# Patient Record
Sex: MALE | Race: BLACK OR AFRICAN AMERICAN | NOT HISPANIC OR LATINO | Employment: FULL TIME | URBAN - METROPOLITAN AREA
[De-identification: names, ages, dates, MRNs, and addresses within clinical notes are randomized per-mention and may not be internally consistent; named-entity substitution may affect disease eponyms.]

---
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
## 2021-06-30 ENCOUNTER — HOSPITAL ENCOUNTER (EMERGENCY)
Facility: HOSPITAL | Age: 21
Discharge: HOME | End: 2021-06-30
Attending: EMERGENCY MEDICINE
Payer: MEDICAID

## 2021-06-30 VITALS
DIASTOLIC BLOOD PRESSURE: 60 MMHG | OXYGEN SATURATION: 97 % | SYSTOLIC BLOOD PRESSURE: 127 MMHG | WEIGHT: 130 LBS | RESPIRATION RATE: 16 BRPM | BODY MASS INDEX: 19.7 KG/M2 | TEMPERATURE: 99 F | HEART RATE: 86 BPM | HEIGHT: 68 IN

## 2021-06-30 DIAGNOSIS — J06.9 VIRAL URI: Primary | ICD-10-CM

## 2021-06-30 LAB
INFLUENZA A, MOLECULAR: NEGATIVE
INFLUENZA B, MOLECULAR: NEGATIVE
SARS-COV-2 RDRP RESP QL NAA+PROBE: NEGATIVE
SPECIMEN SOURCE: NORMAL

## 2021-06-30 PROCEDURE — 99283 EMERGENCY DEPT VISIT LOW MDM: CPT | Mod: ER

## 2021-06-30 PROCEDURE — 87502 INFLUENZA DNA AMP PROBE: CPT | Mod: ER | Performed by: PHYSICIAN ASSISTANT

## 2021-06-30 PROCEDURE — U0002 COVID-19 LAB TEST NON-CDC: HCPCS | Mod: ER | Performed by: PHYSICIAN ASSISTANT

## 2021-06-30 RX ORDER — CETIRIZINE HYDROCHLORIDE 10 MG/1
10 TABLET ORAL DAILY
Qty: 30 TABLET | Refills: 0 | Status: SHIPPED | OUTPATIENT
Start: 2021-06-30 | End: 2021-07-30

## 2022-04-01 ENCOUNTER — OCCUPATIONAL HEALTH (OUTPATIENT)
Dept: URGENT CARE | Facility: CLINIC | Age: 22
End: 2022-04-01

## 2022-04-01 DIAGNOSIS — Z00.00 ENCOUNTER FOR PHYSICAL EXAMINATION: Primary | ICD-10-CM

## 2022-04-01 PROCEDURE — 99499 UNLISTED E&M SERVICE: CPT | Mod: S$GLB,,, | Performed by: NURSE PRACTITIONER

## 2022-04-01 PROCEDURE — 97750 LIFT TEST: ICD-10-PCS | Mod: S$GLB,,, | Performed by: NURSE PRACTITIONER

## 2022-04-01 PROCEDURE — 99499 PHYSICAL, BASIC COMPLEXITY: ICD-10-PCS | Mod: S$GLB,,, | Performed by: NURSE PRACTITIONER

## 2022-04-01 PROCEDURE — 80305 OOH COLLECTION ONLY DRUG SCREEN: ICD-10-PCS | Mod: S$GLB,,, | Performed by: NURSE PRACTITIONER

## 2022-04-01 PROCEDURE — 97750 PHYSICAL PERFORMANCE TEST: CPT | Mod: S$GLB,,, | Performed by: NURSE PRACTITIONER

## 2022-04-01 PROCEDURE — 80305 DRUG TEST PRSMV DIR OPT OBS: CPT | Mod: S$GLB,,, | Performed by: NURSE PRACTITIONER

## 2022-04-19 ENCOUNTER — HOSPITAL ENCOUNTER (EMERGENCY)
Facility: HOSPITAL | Age: 22
Discharge: HOME | End: 2022-04-19
Attending: EMERGENCY MEDICINE
Payer: MEDICAID

## 2022-04-19 VITALS
RESPIRATION RATE: 16 BRPM | OXYGEN SATURATION: 98 % | HEART RATE: 86 BPM | WEIGHT: 140 LBS | DIASTOLIC BLOOD PRESSURE: 74 MMHG | HEIGHT: 69 IN | TEMPERATURE: 99 F | SYSTOLIC BLOOD PRESSURE: 118 MMHG | BODY MASS INDEX: 20.73 KG/M2

## 2022-04-19 DIAGNOSIS — J02.0 PHARYNGITIS, STREPTOCOCCAL, ACUTE: Primary | ICD-10-CM

## 2022-04-19 LAB
GROUP A STREP, MOLECULAR: POSITIVE
INFLUENZA A, MOLECULAR: NEGATIVE
INFLUENZA B, MOLECULAR: NEGATIVE
SARS-COV-2 RDRP RESP QL NAA+PROBE: NEGATIVE
SPECIMEN SOURCE: NORMAL

## 2022-04-19 PROCEDURE — 63600175 PHARM REV CODE 636 W HCPCS: Mod: JG,ER | Performed by: EMERGENCY MEDICINE

## 2022-04-19 PROCEDURE — 99284 EMERGENCY DEPT VISIT MOD MDM: CPT | Mod: 25,ER

## 2022-04-19 PROCEDURE — U0002 COVID-19 LAB TEST NON-CDC: HCPCS | Mod: ER | Performed by: EMERGENCY MEDICINE

## 2022-04-19 PROCEDURE — 87502 INFLUENZA DNA AMP PROBE: CPT | Mod: ER | Performed by: EMERGENCY MEDICINE

## 2022-04-19 PROCEDURE — 87502 INFLUENZA DNA AMP PROBE: CPT | Mod: ER

## 2022-04-19 PROCEDURE — 96372 THER/PROPH/DIAG INJ SC/IM: CPT | Performed by: EMERGENCY MEDICINE

## 2022-04-19 PROCEDURE — 87651 STREP A DNA AMP PROBE: CPT | Mod: ER | Performed by: EMERGENCY MEDICINE

## 2022-04-19 RX ORDER — DEXAMETHASONE SODIUM PHOSPHATE 4 MG/ML
8 INJECTION, SOLUTION INTRA-ARTICULAR; INTRALESIONAL; INTRAMUSCULAR; INTRAVENOUS; SOFT TISSUE
Status: COMPLETED | OUTPATIENT
Start: 2022-04-19 | End: 2022-04-19

## 2022-04-19 RX ADMIN — DEXAMETHASONE SODIUM PHOSPHATE 8 MG: 4 INJECTION, SOLUTION INTRA-ARTICULAR; INTRALESIONAL; INTRAMUSCULAR; INTRAVENOUS; SOFT TISSUE at 08:04

## 2022-04-19 RX ADMIN — PENICILLIN G BENZATHINE 1.2 MILLION UNITS: 1200000 INJECTION, SUSPENSION INTRAMUSCULAR at 08:04

## 2022-04-19 NOTE — Clinical Note
"Nusrat Hungvero Hart was seen and treated in our emergency department on 4/19/2022.  He may return to work on 04/21/2022.       If you have any questions or concerns, please don't hesitate to call.      PRIYA chapman RN    "

## 2022-04-19 NOTE — ED PROVIDER NOTES
Encounter Date: 4/19/2022       History     Chief Complaint   Patient presents with    Sore Throat     Sore throat that started yesterday      Patient is a 21-year-old male with no significant past medical history who presents to the ED with complaint of sore throat.  Patient reports sore throat x1 day.  He denies any fever, chills, nausea, vomiting, diarrhea, chest pain, cough, shortness of breath.  Denies any difficulty with swallowing.  Patient  states that he has not taken anything for hissymptoms.        Review of patient's allergies indicates:  No Known Allergies  History reviewed. No pertinent past medical history.  History reviewed. No pertinent surgical history.  History reviewed. No pertinent family history.  Social History     Tobacco Use    Smoking status: Never Smoker    Smokeless tobacco: Never Used   Substance Use Topics    Alcohol use: No    Drug use: No     Review of Systems   Constitutional: Negative for chills and fever.   HENT: Positive for sore throat. Negative for ear discharge, ear pain, sinus pressure, sinus pain, sneezing and trouble swallowing.    Respiratory: Negative for cough, chest tightness and shortness of breath.    Cardiovascular: Negative for chest pain, palpitations and leg swelling.   Gastrointestinal: Negative for abdominal pain, nausea and vomiting.   Musculoskeletal: Negative for back pain and myalgias.   Neurological: Negative for dizziness and headaches.   Psychiatric/Behavioral: Negative for agitation and confusion.       Physical Exam     Initial Vitals [04/19/22 0800]   BP Pulse Resp Temp SpO2   120/68 83 18 98.6 °F (37 °C) 100 %      MAP       --         Physical Exam    Nursing note and vitals reviewed.  Constitutional: He appears well-developed and well-nourished. He is not diaphoretic. No distress.   Well-appearing   Non toxic   No acute distress noted    HENT:   Head: Normocephalic and atraumatic.   Right Ear: External ear normal.   Left Ear: External ear  normal.   Mouth/Throat: No oropharyngeal exudate, posterior oropharyngeal edema, posterior oropharyngeal erythema or tonsillar abscesses.   Enlarged tonsils   No oropharyngeal exudate  No abscess  Uvula is midline    Eyes: EOM are normal. Pupils are equal, round, and reactive to light.   Neck:   Normal ROM     Normal range of motion.  Cardiovascular: Normal rate, regular rhythm, normal heart sounds and intact distal pulses.   Pulmonary/Chest: Breath sounds normal.   Abdominal: Abdomen is soft. Bowel sounds are normal.   Musculoskeletal:      Cervical back: Normal range of motion.     Neurological: He is alert and oriented to person, place, and time. GCS score is 15. GCS eye subscore is 4. GCS verbal subscore is 5. GCS motor subscore is 6.   Skin: Skin is warm. Capillary refill takes less than 2 seconds.         ED Course   Procedures  Labs Reviewed   GROUP A STREP, MOLECULAR - Abnormal; Notable for the following components:       Result Value    Group A Strep, Molecular Positive (*)     All other components within normal limits   INFLUENZA A & B BY MOLECULAR   SARS-COV-2 RNA AMPLIFICATION, QUAL          Imaging Results    None          Medications   penicillin G benzathine (BICILLIN LA) injection 1.2 Million Units (has no administration in time range)   dexamethasone injection 8 mg (has no administration in time range)     Medical Decision Making:   Clinical Tests:   Lab Tests: Ordered and Reviewed  ED Management:  - VSS; pt afebrile; NAD  - GROUP A STREP swab POSITIVE  - pt administered Bicillin and Dexamethasone in ED  - pt stable for discharge  - No further intervention is indicated at this time after having taken into account the patient's history, physical exam findings, and empirical and objective data obtained during the patient's emergency department workup.   - The patient is at low risk for an emergent medical condition at this time, and I am of the belief that that it is safe to discharge the patient from  the emergency department.   - The patient is instructed to follow up as outpatient as indicated on the discharge paperwork.    - I have discussed the specifics of the workup with the patient and the patient has verbalized understanding of the details of the workup, the diagnosis, the treatment plan, and the need for outpatient follow-up.    - Although the patient has no emergent etiology today this does not preclude the development of an emergent condition so, in addition, I have advised the patient that they can return to the ED and/or activate EMS at any time with worsening of their symptoms, change of their symptoms, or with any other medical complaint.    - The patient remained comfortable and stable during their visit in the ED.    - Discharge and follow-up instructions discussed with the patient who expressed understanding and willingness to comply with my recommendations.  - Results of all emergency department tests  discussed thoroughly with patient; all patient questions answered; pt in agreement with plan  - Pt instructed to follow up with PCP in 2-3 days for recheck of today's complaints  - Pt given strict emergency department return precautions for any new or worsening of symptoms  - Pt discharged from the emergency department in stable condition, in no acute distress                        Clinical Impression:   Final diagnoses:  [J02.0] Pharyngitis, streptococcal, acute (Primary)          ED Disposition Condition    Discharge Stable        ED Prescriptions     None        Follow-up Information     Follow up With Specialties Details Why Contact Info    Demetria Rodas MD Internal Medicine Schedule an appointment as soon as possible for a visit   [address] [place] [id number]  [phone]             Jasbir Sanchez MD  04/19/22 1278

## 2023-02-26 ENCOUNTER — HOSPITAL ENCOUNTER (EMERGENCY)
Facility: HOSPITAL | Age: 23
Discharge: HOME | End: 2023-02-26
Attending: EMERGENCY MEDICINE
Payer: MEDICAID

## 2023-02-26 VITALS
SYSTOLIC BLOOD PRESSURE: 137 MMHG | OXYGEN SATURATION: 99 % | WEIGHT: 140 LBS | HEIGHT: 70 IN | TEMPERATURE: 98 F | RESPIRATION RATE: 17 BRPM | DIASTOLIC BLOOD PRESSURE: 83 MMHG | BODY MASS INDEX: 20.04 KG/M2 | HEART RATE: 75 BPM

## 2023-02-26 DIAGNOSIS — S60.419A ABRASION OF MULTIPLE SITES OF RIGHT HAND AND FINGER, INITIAL ENCOUNTER: Primary | ICD-10-CM

## 2023-02-26 DIAGNOSIS — S60.511A ABRASION OF MULTIPLE SITES OF RIGHT HAND AND FINGER, INITIAL ENCOUNTER: Primary | ICD-10-CM

## 2023-02-26 PROCEDURE — 99284 EMERGENCY DEPT VISIT MOD MDM: CPT | Mod: ER

## 2023-02-26 PROCEDURE — 90471 IMMUNIZATION ADMIN: CPT | Mod: ER | Performed by: EMERGENCY MEDICINE

## 2023-02-26 PROCEDURE — 63600175 PHARM REV CODE 636 W HCPCS: Mod: ER | Performed by: EMERGENCY MEDICINE

## 2023-02-26 PROCEDURE — 96372 THER/PROPH/DIAG INJ SC/IM: CPT | Performed by: EMERGENCY MEDICINE

## 2023-02-26 PROCEDURE — 90715 TDAP VACCINE 7 YRS/> IM: CPT | Mod: ER | Performed by: EMERGENCY MEDICINE

## 2023-02-26 RX ORDER — DICLOFENAC SODIUM 75 MG/1
75 TABLET, DELAYED RELEASE ORAL 2 TIMES DAILY
Qty: 60 TABLET | Refills: 0 | Status: SHIPPED | OUTPATIENT
Start: 2023-02-26

## 2023-02-26 RX ORDER — CEPHALEXIN 500 MG/1
500 CAPSULE ORAL 4 TIMES DAILY
Qty: 20 CAPSULE | Refills: 0 | Status: SHIPPED | OUTPATIENT
Start: 2023-02-26 | End: 2023-03-03

## 2023-02-26 RX ORDER — KETOROLAC TROMETHAMINE 30 MG/ML
15 INJECTION, SOLUTION INTRAMUSCULAR; INTRAVENOUS
Status: COMPLETED | OUTPATIENT
Start: 2023-02-26 | End: 2023-02-26

## 2023-02-26 RX ADMIN — KETOROLAC TROMETHAMINE 15 MG: 30 INJECTION, SOLUTION INTRAMUSCULAR; INTRAVENOUS at 09:02

## 2023-02-26 RX ADMIN — TETANUS TOXOID, REDUCED DIPHTHERIA TOXOID AND ACELLULAR PERTUSSIS VACCINE, ADSORBED 0.5 ML: 5; 2.5; 8; 8; 2.5 SUSPENSION INTRAMUSCULAR at 09:02

## 2023-02-27 NOTE — ED PROVIDER NOTES
Encounter Date: 2/26/2023       History     Chief Complaint   Patient presents with    Laceration     Reports was mad and punched a mirror. Multiple lacerations noted to L hand. Bleeding controlled. Unknown tetanus     HPI  22 y.o.    RHD R hand pain after punching mirror prior to arrival  Last TDAP 2012    Bleeding controlled  No other injuries  Denies SI HI    Review of patient's allergies indicates:  No Known Allergies  History reviewed. No pertinent past medical history.  History reviewed. No pertinent surgical history.  History reviewed. No pertinent family history.  Social History     Tobacco Use    Smoking status: Never    Smokeless tobacco: Never   Substance Use Topics    Alcohol use: No    Drug use: No     Review of Systems  All systems were reviewed/examined and were negative except as noted in the HPI.    Physical Exam     Initial Vitals [02/26/23 2049]   BP Pulse Resp Temp SpO2   137/83 75 17 98.1 °F (36.7 °C) 99 %      MAP       --         Physical Exam    General: the patient is awake, alert, and in no apparent distress.  Head: normocephalic and atraumatic, sclera are clear  Neck: supple without meningismus  Chest:  no respiratory distress  Heart: regular rate and rhythm  Extremities: warm and well perfused    Multiple superficial abrasions dorsum of R hand    Finger ext good x 5    Able to make fist    Nvi    Rest of RUE wnl  Skin: warm and dry  Psych conversant  Neuro: awake, alert, moving all extremities    ED Course   Procedures  Labs Reviewed - No data to display       Imaging Results              X-Ray Hand 3 view Right (Final result)  Result time 02/26/23 21:42:54      Final result by Audrey Donato MD (02/26/23 21:42:54)                   Impression:      No acute fracture or dislocation.      Electronically signed by: Audrey Donato  Date:    02/26/2023  Time:    21:42               Narrative:    EXAMINATION:  XR HAND COMPLETE 3 VIEW RIGHT    CLINICAL HISTORY:  XR HAND COMPLETE 3 VIEW  RIGHT    COMPARISON:  None    FINDINGS:  Three views of the right were obtained.    No evidence of acute fracture or dislocation.  Bony mineralization is normal.  Soft tissues are unremarkable.                                       Medications   Tdap (BOOSTRIX) vaccine injection 0.5 mL (0.5 mLs Intramuscular Given 2/26/23 2139)   ketorolac injection 15 mg (15 mg Intramuscular Given 2/26/23 2139)         Medical Decision Making:    This is an emergent evaluation of a patient presenting to the ED.  Nursing notes were reviewed.  Imaging reviewed by me (x-ray of right hand(s)), personally and independently, and prelims if available.  No acute/emergent pathologic findings noted on radiologic studies ordered.    I reviewed radiology images personally along with interpretations.  I personally reviewed and interpreted the laboratory results.  I decided to obtain and review old medical records, which showed: ED visits    Evaluation for Emergency Medical Condition  The patient received a medical screening exam and within a reasonable degree of clinical confidence an emergency medical condition has not been identified.  The patient is instructed on proper follow up and return precautions to the ED.    The patient was encouraged strongly to get the COVID-19 vaccine either after asymptomatic (if COVID positive) or offered it here in the ED is COVID negative.  The patient was also encouraged to obtain an influenza vaccination if available once asymptomatic (if positive) or if testing negative in the ED.      TDAP  Rx      Alejandro Ghosh MD, JANUSZ                       Clinical Impression:   Final diagnoses:  [S60.511A, S60.419A] Abrasion of multiple sites of right hand and finger, initial encounter (Primary)        ED Disposition Condition    Discharge Stable          ED Prescriptions       Medication Sig Dispense Start Date End Date Auth. Provider    diclofenac (VOLTAREN) 75 MG EC tablet Take 1 tablet (75 mg total) by mouth 2 (two)  times daily. 60 tablet 2/26/2023 -- Ravin Ghosh MD    cephALEXin (KEFLEX) 500 MG capsule Take 1 capsule (500 mg total) by mouth 4 (four) times daily. for 5 days 20 capsule 2/26/2023 3/3/2023 Ravin Ghosh MD          Follow-up Information       Follow up With Specialties Details Why Contact Info    Demetria Rodas MD Internal Medicine Schedule an appointment as soon as possible for a visit   [address] [facility] [address]  [address]  [facility] [id number] [phone]            Discharged to home in stable condition, return to ED warnings given, follow up and patient care instructions given.      Alejandro Ghohs MD, JANUSZ, FACEP  Department of Emergency Medicine       Ravin Ghosh MD  02/27/23 0153

## 2023-05-26 ENCOUNTER — HOSPITAL ENCOUNTER (EMERGENCY)
Facility: HOSPITAL | Age: 23
Discharge: HOME | End: 2023-05-26
Attending: EMERGENCY MEDICINE
Payer: MEDICAID

## 2023-05-26 VITALS
DIASTOLIC BLOOD PRESSURE: 62 MMHG | HEART RATE: 58 BPM | OXYGEN SATURATION: 99 % | TEMPERATURE: 98 F | HEIGHT: 69 IN | SYSTOLIC BLOOD PRESSURE: 117 MMHG | RESPIRATION RATE: 16 BRPM | WEIGHT: 130 LBS | BODY MASS INDEX: 19.26 KG/M2

## 2023-05-26 DIAGNOSIS — R10.84 GENERALIZED ABDOMINAL PAIN: Primary | ICD-10-CM

## 2023-05-26 DIAGNOSIS — R11.2 NAUSEA & VOMITING: ICD-10-CM

## 2023-05-26 LAB
BILIRUB UR QL STRIP: NEGATIVE
CLARITY UR REFRACT.AUTO: CLEAR
COLOR UR AUTO: YELLOW
GLUCOSE UR QL STRIP: NEGATIVE
HGB UR QL STRIP: ABNORMAL
KETONES UR QL STRIP: NEGATIVE
LEUKOCYTE ESTERASE UR QL STRIP: NEGATIVE
NITRITE UR QL STRIP: NEGATIVE
PH UR STRIP: 6 [PH] (ref 5–8)
PROT UR QL STRIP: NEGATIVE
SP GR UR STRIP: >=1.03 (ref 1–1.03)
URN SPEC COLLECT METH UR: ABNORMAL
UROBILINOGEN UR STRIP-ACNC: 1 EU/DL

## 2023-05-26 PROCEDURE — 99284 EMERGENCY DEPT VISIT MOD MDM: CPT | Mod: ER

## 2023-05-26 PROCEDURE — 81003 URINALYSIS AUTO W/O SCOPE: CPT | Mod: ER | Performed by: EMERGENCY MEDICINE

## 2023-05-26 PROCEDURE — 96372 THER/PROPH/DIAG INJ SC/IM: CPT | Performed by: EMERGENCY MEDICINE

## 2023-05-26 PROCEDURE — 25000003 PHARM REV CODE 250: Mod: ER | Performed by: EMERGENCY MEDICINE

## 2023-05-26 PROCEDURE — 63600175 PHARM REV CODE 636 W HCPCS: Mod: ER | Performed by: EMERGENCY MEDICINE

## 2023-05-26 RX ORDER — LACTULOSE 10 G/15ML
10 SOLUTION ORAL 2 TIMES DAILY PRN
Qty: 150 ML | Refills: 0 | Status: SHIPPED | OUTPATIENT
Start: 2023-05-26

## 2023-05-26 RX ORDER — MAG HYDROX/ALUMINUM HYD/SIMETH 200-200-20
30 SUSPENSION, ORAL (FINAL DOSE FORM) ORAL ONCE
Status: COMPLETED | OUTPATIENT
Start: 2023-05-26 | End: 2023-05-26

## 2023-05-26 RX ORDER — ONDANSETRON 4 MG/1
4 TABLET, ORALLY DISINTEGRATING ORAL EVERY 6 HOURS PRN
Qty: 10 TABLET | Refills: 0 | Status: SHIPPED | OUTPATIENT
Start: 2023-05-26

## 2023-05-26 RX ORDER — PROCHLORPERAZINE EDISYLATE 5 MG/ML
10 INJECTION INTRAMUSCULAR; INTRAVENOUS
Status: COMPLETED | OUTPATIENT
Start: 2023-05-26 | End: 2023-05-26

## 2023-05-26 RX ORDER — DICYCLOMINE HYDROCHLORIDE 20 MG/1
20 TABLET ORAL 3 TIMES DAILY PRN
Qty: 14 TABLET | Refills: 0 | Status: SHIPPED | OUTPATIENT
Start: 2023-05-26 | End: 2023-06-25

## 2023-05-26 RX ORDER — KETOROLAC TROMETHAMINE 30 MG/ML
30 INJECTION, SOLUTION INTRAMUSCULAR; INTRAVENOUS
Status: COMPLETED | OUTPATIENT
Start: 2023-05-26 | End: 2023-05-26

## 2023-05-26 RX ORDER — LIDOCAINE HYDROCHLORIDE 20 MG/ML
15 SOLUTION OROPHARYNGEAL ONCE
Status: COMPLETED | OUTPATIENT
Start: 2023-05-26 | End: 2023-05-26

## 2023-05-26 RX ADMIN — KETOROLAC TROMETHAMINE 30 MG: 30 INJECTION, SOLUTION INTRAMUSCULAR; INTRAVENOUS at 06:05

## 2023-05-26 RX ADMIN — PROCHLORPERAZINE EDISYLATE 10 MG: 5 INJECTION INTRAMUSCULAR; INTRAVENOUS at 06:05

## 2023-05-26 RX ADMIN — LIDOCAINE HYDROCHLORIDE 15 ML: 20 SOLUTION ORAL at 07:05

## 2023-05-26 RX ADMIN — ALUMINUM HYDROXIDE, MAGNESIUM HYDROXIDE, AND SIMETHICONE 30 ML: 200; 200; 20 SUSPENSION ORAL at 07:05

## 2023-05-26 NOTE — DISCHARGE INSTRUCTIONS
Please make sure you are drinking plenty of fluids.  Please arrange for a follow-up appointment with your primary care physician for further evaluation and management.  Please return with any new or worsening symptoms.

## 2023-05-26 NOTE — Clinical Note
"Nusrat Hungvero Hart was seen and treated in our emergency department on 5/26/2023.  He may return to work on 05/28/2023.       If you have any questions or concerns, please don't hesitate to call.      daylin chapman RN    "

## 2024-02-20 ENCOUNTER — HOSPITAL ENCOUNTER (EMERGENCY)
Facility: HOSPITAL | Age: 24
Discharge: HOME | End: 2024-02-20
Attending: EMERGENCY MEDICINE
Payer: MEDICAID

## 2024-02-20 VITALS
DIASTOLIC BLOOD PRESSURE: 64 MMHG | BODY MASS INDEX: 19.26 KG/M2 | HEIGHT: 69 IN | HEART RATE: 83 BPM | RESPIRATION RATE: 18 BRPM | SYSTOLIC BLOOD PRESSURE: 139 MMHG | OXYGEN SATURATION: 96 % | WEIGHT: 130 LBS | TEMPERATURE: 99 F

## 2024-02-20 DIAGNOSIS — J10.1 INFLUENZA A: Primary | ICD-10-CM

## 2024-02-20 LAB
GROUP A STREP, MOLECULAR: NEGATIVE
INFLUENZA A, MOLECULAR: POSITIVE
INFLUENZA B, MOLECULAR: NEGATIVE
SARS-COV-2 RDRP RESP QL NAA+PROBE: NEGATIVE
SPECIMEN SOURCE: ABNORMAL

## 2024-02-20 PROCEDURE — U0002 COVID-19 LAB TEST NON-CDC: HCPCS | Mod: ER

## 2024-02-20 PROCEDURE — 99284 EMERGENCY DEPT VISIT MOD MDM: CPT | Mod: ER

## 2024-02-20 PROCEDURE — 87651 STREP A DNA AMP PROBE: CPT | Mod: ER

## 2024-02-20 PROCEDURE — 87502 INFLUENZA DNA AMP PROBE: CPT | Mod: ER

## 2024-02-20 RX ORDER — AZELASTINE 1 MG/ML
1 SPRAY, METERED NASAL 2 TIMES DAILY
Qty: 30 ML | Refills: 0 | Status: SHIPPED | OUTPATIENT
Start: 2024-02-20 | End: 2025-02-19

## 2024-02-20 RX ORDER — GUAIFENESIN 600 MG/1
1200 TABLET, EXTENDED RELEASE ORAL 2 TIMES DAILY
Qty: 28 TABLET | Refills: 0 | Status: SHIPPED | OUTPATIENT
Start: 2024-02-20 | End: 2024-02-27

## 2024-02-20 RX ORDER — CETIRIZINE HYDROCHLORIDE 10 MG/1
10 TABLET ORAL DAILY
Qty: 14 TABLET | Refills: 0 | Status: SHIPPED | OUTPATIENT
Start: 2024-02-20 | End: 2024-03-05

## 2024-02-20 RX ORDER — BENZONATATE 100 MG/1
100 CAPSULE ORAL 2 TIMES DAILY
Qty: 10 CAPSULE | Refills: 0 | Status: SHIPPED | OUTPATIENT
Start: 2024-02-20 | End: 2024-02-25

## 2024-02-20 NOTE — DISCHARGE INSTRUCTIONS
Thank you for letting me care for you today - it was nice to meet you and I hope you feel better soon. Please return to the ER if your symptoms don't improve or get worse. And be sure to follow up with your primary care provider within the next week if not improving.     Rotate between Tylenol and ibuprofen (Motrin), switching every 3 hours. For example, Tylenol at 8am, ibuprofen at 11am, tylenol at 2pm. See attached printout for more information about managing influenza symptoms.     Our goal at [facility] is to always give you outstanding care and exceptional service. You may receive a survey by mail or email in the next week about your experience in our ED. We would greatly appreciate you completing and returning the survey. Your feedback provides us with a way to recognize our staff who give very good care and it helps us learn how to improve when your experience was below our aspiration of excellence.     All the best,     Beatriz Taveras, MPH, PA-C  Emergency Department Physician Assistant  [facility]

## 2024-02-20 NOTE — Clinical Note
"Nusrat"Shiraz Hart was seen and treated in our emergency department on 2/20/2024.  He may return to work on 02/23/2024.       If you have any questions or concerns, please don't hesitate to call.      Marino Contreras Jr., MD"

## 2024-02-20 NOTE — ED PROVIDER NOTES
Encounter Date: 2/20/2024       History     Chief Complaint   Patient presents with    COVID-19 Concerns     Pt rpts cough, sore throat, and headache x2 days. Denies nvd. + exposure to sick person      Patient is a 23 y.o. male who presents with cough, sore throat, nasal congestion, headache X 1 day. Patient does reports close contacts with similar symptoms - patient's sister tested positive for COVID approximately 2 weeks ago. Patient has taken no medication for symptom relief.  Denies fever, chest pain, shortness of breath, wheezing, stridor, drooling, NVD, abdominal pain, constipation, urinary problems, joint problems, rashes, or any other complaints at this time.      The history is provided by the patient.     Review of patient's allergies indicates:  No Known Allergies  No past medical history on file.  No past surgical history on file.  No family history on file.  Social History     Tobacco Use    Smoking status: Never    Smokeless tobacco: Never   Substance Use Topics    Alcohol use: No    Drug use: No     Review of Systems   Constitutional:  Negative for chills and fever.   HENT:  Positive for congestion, postnasal drip, rhinorrhea and sore throat. Negative for ear discharge, ear pain, facial swelling, trouble swallowing and voice change.    Respiratory:  Positive for cough. Negative for shortness of breath and wheezing.    Cardiovascular:  Negative for chest pain.   Gastrointestinal:  Negative for abdominal distention, abdominal pain, diarrhea, nausea and vomiting.   Genitourinary:  Negative for dysuria, flank pain, frequency and hematuria.   Musculoskeletal:  Negative for back pain, neck pain and neck stiffness.   Skin:  Negative for rash.   Neurological:  Positive for headaches. Negative for weakness.   Hematological:  Does not bruise/bleed easily.   All other systems reviewed and are negative.      Physical Exam     Initial Vitals [02/20/24 1049]   BP Pulse Resp Temp SpO2   139/64 83 18 99 °F (37.2 °C)  96 %      MAP       --         Physical Exam    Vitals reviewed.  Constitutional: He appears well-developed and well-nourished.   HENT:   Head: Normocephalic and atraumatic.   Nose: Rhinorrhea present.   Mouth/Throat: Uvula is midline. Posterior oropharyngeal edema and posterior oropharyngeal erythema present. No oropharyngeal exudate.   Eyes: EOM are normal. Pupils are equal, round, and reactive to light.   Neck:   Normal range of motion.  Cardiovascular:  Normal rate, regular rhythm and normal heart sounds.           Pulmonary/Chest: Breath sounds normal. No respiratory distress. He has no wheezes. He has no rhonchi. He has no rales.   Abdominal: Bowel sounds are normal. He exhibits no distension. There is no abdominal tenderness. There is no rebound.   Musculoskeletal:      Cervical back: Normal range of motion.     Neurological: He is alert and oriented to person, place, and time.         ED Course   Procedures  Labs Reviewed   INFLUENZA A & B BY MOLECULAR - Abnormal; Notable for the following components:       Result Value    Influenza A, Molecular Positive (*)     All other components within normal limits   GROUP A STREP, MOLECULAR   SARS-COV-2 RNA AMPLIFICATION, QUAL    Narrative:     Is the patient symptomatic?->Yes          Imaging Results    None          Medications - No data to display  Medical Decision Making  Patient is an afebrile, well-appearing 23 y.o. male. VSS. Denies fever, chest pain, SOB, wheezing, difficulty swallowing, neck pain, neck stiffness. Vital signs do not suggest sepsis. Lung sounds are clear and not consistent with pneumonia. There is no neck pain or limited ROM to suggest retropharyngeal abscess or meningitis. Tolerating PO, non-toxic appearing, no hypoxia. The patient remained comfortable and stable during their visit in the ED.  Details of ED course documented in ED workup.     Differential Diagnosis includes, but is not limited to: COVID, influenza, viral URI, bacterial/viral  pharyngitis    COVID test negative. Influenza test positive. Strep test negative.    All historical, clinical, and laboratory findings reviewed. Patient with constellation of symptoms most consistent with influenza. There are no concerning features on physical exam to suggest an emergent or life threatening condition or an invasive bacterial infection, including, but not limited to: bacterial otitis media/externa, sinusitis, pharyngitis, or peritonsillar abscess. No further intervention is indicated at this time. The patient is at low risk for an emergent/life threatening medical condition at this time, and I am of the belief that that it is safe to discharge the patient from the emergency department.     Patient instructed to follow up with PCP in 2-3 days for recheck of today's complaints. Patient has been counseled regarding the need for follow-up as well as the indications to return to the emergency room should new or worrisome developments. Discharge and follow-up instructions discussed with the patient who expressed understanding and willingness to comply with recommendations. Patient discharged from the emergency department in stable condition, in no acute distress.     Amount and/or Complexity of Data Reviewed  Labs: ordered. Decision-making details documented in ED Course.               ED Course as of 02/20/24 1136   Tue Feb 20, 2024   1046 Patient examined and assessed. Patient answering questions appropriately, speaking in complete sentences, respirations are even and unlabored.  AAO x 4.  [OB]   1122 Group A Strep, Molecular: Negative [OB]   1125 SARS-CoV-2 RNA, Amplification, Qual: Negative [OB]   1134 Influenza A, Molecular(!): Positive [OB]   1134 Influenza B, Molecular: Negative [OB]      ED Course User Index  [OB] Beatriz Taveras PA-C                           Clinical Impression:  Final diagnoses:  [J10.1] Influenza A (Primary)          ED Disposition Condition    Discharge Stable          ED  Prescriptions       Medication Sig Dispense Start Date End Date Auth. Provider    cetirizine (ZYRTEC) 10 MG tablet Take 1 tablet (10 mg total) by mouth once daily. for 14 days 14 tablet 2/20/2024 3/5/2024 Beatriz Taveras PA-C    benzonatate (TESSALON) 100 MG capsule Take 1 capsule (100 mg total) by mouth 2 (two) times a day. for 5 days 10 capsule 2/20/2024 2/25/2024 Beatriz Taveras PA-C    guaiFENesin (MUCINEX) 600 mg 12 hr tablet Take 2 tablets (1,200 mg total) by mouth 2 (two) times daily. for 7 days 28 tablet 2/20/2024 2/27/2024 Beatriz Taveras PA-C    azelastine (ASTELIN) 137 mcg (0.1 %) nasal spray 1 spray (137 mcg total) by Nasal route 2 (two) times daily. 30 mL 2/20/2024 2/19/2025 Beatriz Taveras PA-C          Follow-up Information       Follow up With Specialties Details Why Contact Info    Demetria Rodas MD Internal Medicine In 3 days As needed, If symptoms worsen [address]  [address]  [facility] [place] [address] [phone]               Beatriz Taveras PA-C  02/20/24 7083

## 2024-02-20 NOTE — Clinical Note
"Nusrat"Shiraz Hart was seen and treated in our emergency department on 2/20/2024.  He may return to work on 02/24/2024.  Nusrat Hart tested positive for the flu. Employees should stay home if they are sick until at least 24 hours after their fever* (temperature of 100 degrees F is gone). Temperature should be measured without the use of ibuprofen or acetaminophen.    Not everyone with flu will have a fever. Individuals with the flu should stay home from work at least 4-5 days after the onset of symptoms. Persons with the flu are most contagious during the first 3 days of their illness.      If you have any questions or concerns, please don't hesitate to call.      Beatriz Taveras PA-C"